# Patient Record
Sex: MALE | Race: WHITE
[De-identification: names, ages, dates, MRNs, and addresses within clinical notes are randomized per-mention and may not be internally consistent; named-entity substitution may affect disease eponyms.]

---

## 2019-01-14 PROBLEM — Z00.00 ENCOUNTER FOR PREVENTIVE HEALTH EXAMINATION: Status: ACTIVE | Noted: 2019-01-14

## 2019-01-17 ENCOUNTER — APPOINTMENT (OUTPATIENT)
Dept: SURGERY | Facility: CLINIC | Age: 49
End: 2019-01-17
Payer: COMMERCIAL

## 2019-01-17 VITALS
DIASTOLIC BLOOD PRESSURE: 84 MMHG | TEMPERATURE: 98 F | RESPIRATION RATE: 15 BRPM | WEIGHT: 236 LBS | HEART RATE: 85 BPM | BODY MASS INDEX: 37.04 KG/M2 | HEIGHT: 67 IN | OXYGEN SATURATION: 99 % | SYSTOLIC BLOOD PRESSURE: 117 MMHG

## 2019-01-17 DIAGNOSIS — Z82.49 FAMILY HISTORY OF ISCHEMIC HEART DISEASE AND OTHER DISEASES OF THE CIRCULATORY SYSTEM: ICD-10-CM

## 2019-01-17 DIAGNOSIS — Z78.9 OTHER SPECIFIED HEALTH STATUS: ICD-10-CM

## 2019-01-17 DIAGNOSIS — L72.3 SEBACEOUS CYST: ICD-10-CM

## 2019-01-17 DIAGNOSIS — Z86.14 PERSONAL HISTORY OF METHICILLIN RESISTANT STAPHYLOCOCCUS AUREUS INFECTION: ICD-10-CM

## 2019-01-17 PROCEDURE — 99243 OFF/OP CNSLTJ NEW/EST LOW 30: CPT

## 2019-01-17 NOTE — PHYSICAL EXAM
[Normal Breath Sounds] : Normal breath sounds [Normal Heart Sounds] : normal heart sounds [Normal Rate and Rhythm] : normal rate and rhythm [No Rash or Lesion] : No rash or lesion [Alert] : alert [Oriented to Person] : oriented to person [Oriented to Place] : oriented to place [Oriented to Time] : oriented to time [Calm] : calm [JVD] : no jugular venous distention  [de-identified] : Well nourished male, in no apparent distress [de-identified] : WNL [de-identified] : Full ROM [de-identified] : The small area of I&D of the cyst of the right axilla is nontender there is no further drainage there is still some induration in the area.

## 2019-01-17 NOTE — CONSULT LETTER
[Dear  ___] : Dear  [unfilled], [Consult Letter:] : I had the pleasure of evaluating your patient, [unfilled]. [Please see my note below.] : Please see my note below. [Consult Closing:] : Thank you very much for allowing me to participate in the care of this patient.  If you have any questions, please do not hesitate to contact me. [Sincerely,] : Sincerely, [FreeTextEntry3] : I have reviewed all the documentation for this encounter with the patient and have edited where appropriate\par \par Dr. Bo Corona

## 2019-01-17 NOTE — ASSESSMENT
[FreeTextEntry1] : There is still some surrounding induration from the previous cellulitis. I've asked the patient to return in approximately a week after he finishes his course of Bactrim. At that time if there is a residual cyst we will set the patient up to have an excision of that cyst of the right axilla.

## 2019-01-17 NOTE — HISTORY OF PRESENT ILLNESS
[de-identified] : Pito is a 49 y/o male here for evaluation of cyst. Patient is currently on Bactrim. PMH of MRSA. The patient noticed a swelling under his right arm and in the axilla which cause some pain on palpation. The patient squeezed this and set up an area of cellulitis of his arm which she required treatment at the hospital with a I&D the abscess. Patient has been on Bactrim since the seventh we'll complete his 2 week course on the 21st.  The patient is asymptomatic at the present time. There's been no further drainage from the area.

## 2019-01-29 ENCOUNTER — APPOINTMENT (OUTPATIENT)
Dept: SURGERY | Facility: CLINIC | Age: 49
End: 2019-01-29

## 2021-03-17 ENCOUNTER — APPOINTMENT (OUTPATIENT)
Dept: GASTROENTEROLOGY | Facility: CLINIC | Age: 51
End: 2021-03-17
Payer: COMMERCIAL

## 2021-03-17 VITALS
WEIGHT: 224 LBS | HEIGHT: 67 IN | BODY MASS INDEX: 35.16 KG/M2 | SYSTOLIC BLOOD PRESSURE: 120 MMHG | DIASTOLIC BLOOD PRESSURE: 79 MMHG

## 2021-03-17 DIAGNOSIS — Z80.7 FAMILY HISTORY OF OTHER MALIGNANT NEOPLASMS OF LYMPHOID, HEMATOPOIETIC AND RELATED TISSUES: ICD-10-CM

## 2021-03-17 PROCEDURE — 99213 OFFICE O/P EST LOW 20 MIN: CPT

## 2021-03-17 PROCEDURE — 99072 ADDL SUPL MATRL&STAF TM PHE: CPT

## 2021-03-17 RX ORDER — SULFAMETHOXAZOLE AND TRIMETHOPRIM 400; 80 MG/1; MG/1
TABLET ORAL
Refills: 0 | Status: DISCONTINUED | COMMUNITY
End: 2021-03-17

## 2021-03-17 NOTE — PHYSICAL EXAM
[General Appearance - Alert] : alert [General Appearance - In No Acute Distress] : in no acute distress [Sclera] : the sclera and conjunctiva were normal [PERRL With Normal Accommodation] : pupils were equal in size, round, and reactive to light [Extraocular Movements] : extraocular movements were intact [Outer Ear] : the ears and nose were normal in appearance [Oropharynx] : the oropharynx was normal [Neck Appearance] : the appearance of the neck was normal [Neck Cervical Mass (___cm)] : no neck mass was observed [Jugular Venous Distention Increased] : there was no jugular-venous distention [Thyroid Diffuse Enlargement] : the thyroid was not enlarged [Thyroid Nodule] : there were no palpable thyroid nodules [Auscultation Breath Sounds / Voice Sounds] : lungs were clear to auscultation bilaterally [Heart Rate And Rhythm] : heart rate was normal and rhythm regular [Heart Sounds] : normal S1 and S2 [Heart Sounds Gallop] : no gallops [Murmurs] : no murmurs [Heart Sounds Pericardial Friction Rub] : no pericardial rub [Bowel Sounds] : normal bowel sounds [Abdomen Soft] : soft [Abdomen Tenderness] : non-tender [Abdomen Mass (___ Cm)] : no abdominal mass palpated [Cervical Lymph Nodes Enlarged Posterior Bilaterally] : posterior cervical [Cervical Lymph Nodes Enlarged Anterior Bilaterally] : anterior cervical [Supraclavicular Lymph Nodes Enlarged Bilaterally] : supraclavicular [Axillary Lymph Nodes Enlarged Bilaterally] : axillary [Femoral Lymph Nodes Enlarged Bilaterally] : femoral [Inguinal Lymph Nodes Enlarged Bilaterally] : inguinal [No CVA Tenderness] : no ~M costovertebral angle tenderness [No Spinal Tenderness] : no spinal tenderness [Abnormal Walk] : normal gait [Nail Clubbing] : no clubbing  or cyanosis of the fingernails [Musculoskeletal - Swelling] : no joint swelling seen [Motor Tone] : muscle strength and tone were normal [Skin Color & Pigmentation] : normal skin color and pigmentation [Skin Turgor] : normal skin turgor [] : no rash [Deep Tendon Reflexes (DTR)] : deep tendon reflexes were 2+ and symmetric [Sensation] : the sensory exam was normal to light touch and pinprick [No Focal Deficits] : no focal deficits [Oriented To Time, Place, And Person] : oriented to person, place, and time [Impaired Insight] : insight and judgment were intact [Affect] : the affect was normal

## 2021-03-17 NOTE — CONSULT LETTER
[Dear  ___] : Dear  [unfilled], [Consult Letter:] : I had the pleasure of evaluating your patient, [unfilled]. [( Thank you for referring [unfilled] for consultation for _____ )] : Thank you for referring [unfilled] for consultation for [unfilled] [Please see my note below.] : Please see my note below. [Consult Closing:] : Thank you very much for allowing me to participate in the care of this patient.  If you have any questions, please do not hesitate to contact me. [Sincerely,] : Sincerely, [FreeTextEntry3] : Don\par \par Emiliano Fuentes MD\par

## 2021-03-17 NOTE — ASSESSMENT
[FreeTextEntry1] : Colon Cancer Screening\par \par AVRIL RAMIREZ was advised to undergo colonoscopy to which he agreed. The procedure will be performed in [facility] \[facility] with the assistance of an anesthesiologist. The patient was given a Suprep preparation prescription and understood the \par procedure as it was explained to his. He was given a booklet distributed by the American Society of Gastrointestinal\par  Endoscopy explaining the procedure in detail and he understood the risks of the procedure not limited to infection, bleeding,\par perforation or non- diagnosis of colorectal cancer. He was advised that he could not drive home, if he chooses to\par  receive sedation.\par \par Further diagnostic and treatment recommendations will be based upon the procedure and any biopsies, if they are taken.\par \par Thank you for allowing me to participate in this [facility] health care.\par \par , Best personal regards -- Don\par

## 2021-03-17 NOTE — HISTORY OF PRESENT ILLNESS
[FreeTextEntry1] : He is 51 year old asymptomatic male referred for a  screening colonoscopy.  He denies a family history of colon cancer.

## 2021-03-29 ENCOUNTER — LABORATORY RESULT (OUTPATIENT)
Age: 51
End: 2021-03-29

## 2021-04-01 ENCOUNTER — APPOINTMENT (OUTPATIENT)
Dept: GASTROENTEROLOGY | Facility: AMBULATORY SURGERY CENTER | Age: 51
End: 2021-04-01
Payer: COMMERCIAL

## 2021-04-01 DIAGNOSIS — K63.5 POLYP OF COLON: ICD-10-CM

## 2021-04-01 PROCEDURE — 45380 COLONOSCOPY AND BIOPSY: CPT | Mod: 33,59

## 2021-04-01 PROCEDURE — 45385 COLONOSCOPY W/LESION REMOVAL: CPT | Mod: 33

## 2021-04-05 PROBLEM — K63.5 COLON POLYPS: Status: ACTIVE | Noted: 2021-04-05

## 2022-12-30 ENCOUNTER — APPOINTMENT (OUTPATIENT)
Dept: NEUROSURGERY | Facility: CLINIC | Age: 52
End: 2022-12-30
Payer: COMMERCIAL

## 2022-12-30 ENCOUNTER — NON-APPOINTMENT (OUTPATIENT)
Age: 52
End: 2022-12-30

## 2022-12-30 DIAGNOSIS — Z87.891 PERSONAL HISTORY OF NICOTINE DEPENDENCE: ICD-10-CM

## 2022-12-30 DIAGNOSIS — G50.0 TRIGEMINAL NEURALGIA: ICD-10-CM

## 2022-12-30 PROCEDURE — 99204 OFFICE O/P NEW MOD 45 MIN: CPT

## 2022-12-30 RX ORDER — OXCARBAZEPINE 150 MG/1
150 TABLET, FILM COATED ORAL
Refills: 0 | Status: ACTIVE | COMMUNITY

## 2022-12-30 RX ORDER — SODIUM SULFATE, POTASSIUM SULFATE, MAGNESIUM SULFATE 17.5; 3.13; 1.6 G/ML; G/ML; G/ML
17.5-3.13-1.6 SOLUTION, CONCENTRATE ORAL TWICE DAILY
Qty: 2 | Refills: 0 | Status: DISCONTINUED | COMMUNITY
Start: 2021-03-17 | End: 2022-12-30

## 2023-01-05 PROBLEM — G50.0 RIGHT TRIGEMINAL NEURALGIA: Status: ACTIVE | Noted: 2023-01-05

## 2023-01-05 NOTE — ASSESSMENT
[FreeTextEntry1] : IMPRESSION:\par 1. Pt with right V3 pain that started about 1.5 - 2 weeks ago.  He started taking Oxcarbazepine 100 mg daily and pain subsided.\par \par \par \par PLAN:\par 1. Continue Oxcarbazepine as ordered and follow up with Dr. Paulson.\par 2. Explained that there is no indication for neurosurgical intervention at this time as pain is being managed with Tegretol unless patient would like to proceed.\par 3. F/U in 3 months clinical follow up - no imaging needed prior to visit.\par 4. Explained treatment options of radiosurgery, injection of numbing medications and MVD with risks and benefits of each option discussed\par

## 2023-01-05 NOTE — PHYSICAL EXAM
[Motor Handedness Right-Handed] : the patient is right hand dominant [General Appearance - Alert] : alert [General Appearance - Well Nourished] : well nourished [General Appearance - Well-Appearing] : healthy appearing [Oriented To Time, Place, And Person] : oriented to person, place, and time [Affect] : the affect was normal [Memory Recent] : recent memory was not impaired [Person] : oriented to person [Place] : oriented to place [Time] : oriented to time [Sensation Tactile Decrease] : light touch was intact [Sclera] : the sclera and conjunctiva were normal [Outer Ear] : the ears and nose were normal in appearance [Neck Appearance] : the appearance of the neck was normal [] : no respiratory distress [Respiration, Rhythm And Depth] : normal respiratory rhythm and effort [Exaggerated Use Of Accessory Muscles For Inspiration] : no accessory muscle use [Heart Rate And Rhythm] : heart rate was normal and rhythm regular [Abnormal Walk] : normal gait [Involuntary Movements] : no involuntary movements were seen [Motor Tone] : muscle strength and tone were normal [Skin Color & Pigmentation] : normal skin color and pigmentation

## 2023-01-05 NOTE — HISTORY OF PRESENT ILLNESS
[< 3 months] : less than 3 months [FreeTextEntry1] : "Pain in jaw rt side" [de-identified] : Pito Gonzalez is a pleasant 52 year old gentleman who presents for consultation regarding V3 pain described as a shooting pain 10/10 and lasted for about 3 days that occurred about 1.5 - 2 months ago.  Pt followed up with a dentist and was reassured that his teeth are in good repair.  He followed with Neurologist Dr. Paulson and takes Oxcarbazepine 100 mg daily for the past 1.5 weeks.  States that he does not take the medication on a daily basis however the pain decreased after he started taking the medication.  He reports a weird sensation that is not pain when he chews on the right side so he avoids doing that.\par \par Pain is described as intense when it started and is aggravated by cold air, eating.  \par \par

## 2023-02-27 ENCOUNTER — APPOINTMENT (OUTPATIENT)
Dept: ORTHOPEDIC SURGERY | Facility: CLINIC | Age: 53
End: 2023-02-27
Payer: COMMERCIAL

## 2023-02-27 VITALS — WEIGHT: 224 LBS | HEIGHT: 67 IN | BODY MASS INDEX: 35.16 KG/M2

## 2023-02-27 PROCEDURE — 73502 X-RAY EXAM HIP UNI 2-3 VIEWS: CPT

## 2023-02-27 PROCEDURE — 99204 OFFICE O/P NEW MOD 45 MIN: CPT

## 2023-02-27 PROCEDURE — 73610 X-RAY EXAM OF ANKLE: CPT | Mod: LT

## 2023-02-27 NOTE — PHYSICAL EXAM
[Lateral] : lateral [Mild arthritis (Tonnis Grade 1)] : Mild arthritis (Tonnis Grade 1) [5___] : plantar flexion 5[unfilled]/5 [2+] : posterior tibialis pulse: 2+ [Left] : left ankle [Right] : right hip [AP] : anteroposterior [There are no fractures, subluxations or dislocations. No significant abnormalities are seen] : There are no fractures, subluxations or dislocations. No significant abnormalities are seen [Weight -] : weightbearing [] : no calf tenderness [FreeTextEntry9] : calc spur [de-identified] : plantar flexion 30 degrees [TWNoteComboBox7] : dorsiflexion 10 degrees

## 2023-02-27 NOTE — HISTORY OF PRESENT ILLNESS
[5] : 5 [Dull/Aching] : dull/aching [Sharp] : sharp [Throbbing] : throbbing [Constant] : constant [de-identified] : 52 year old male with pain in the right hip and left ankle/Achilles region. Symptoms in the right hip feels like the muscles are all tight in the area, pain on the side of the hip, walking, sleeping, certain motions increase the symptoms. As or the Achilles, he has been told he has a bone spur that irritates the achilles and that recently flared up. Has been limping.Recurred a few moths ago. He went to PT a few times. From favoring developed pain in R hip and gluteal area [] : no [FreeTextEntry1] : left achilles, right hip  [FreeTextEntry6] : weakness

## 2023-03-31 ENCOUNTER — APPOINTMENT (OUTPATIENT)
Dept: NEUROSURGERY | Facility: CLINIC | Age: 53
End: 2023-03-31

## 2023-06-27 ENCOUNTER — APPOINTMENT (OUTPATIENT)
Dept: ORTHOPEDIC SURGERY | Facility: CLINIC | Age: 53
End: 2023-06-27
Payer: COMMERCIAL

## 2023-06-27 VITALS — BODY MASS INDEX: 35.16 KG/M2 | HEIGHT: 67 IN | WEIGHT: 224 LBS

## 2023-06-27 DIAGNOSIS — M70.71 OTHER BURSITIS OF HIP, RIGHT HIP: ICD-10-CM

## 2023-06-27 DIAGNOSIS — S76.011D STRAIN OF MUSCLE, FASCIA AND TENDON OF RIGHT HIP, SUBSEQUENT ENCOUNTER: ICD-10-CM

## 2023-06-27 DIAGNOSIS — M76.62 ACHILLES TENDINITIS, LEFT LEG: ICD-10-CM

## 2023-06-27 DIAGNOSIS — M76.822 POSTERIOR TIBIAL TENDINITIS, LEFT LEG: ICD-10-CM

## 2023-06-27 PROCEDURE — 99214 OFFICE O/P EST MOD 30 MIN: CPT

## 2023-06-27 RX ORDER — MELOXICAM 15 MG/1
15 TABLET ORAL
Qty: 30 | Refills: 1 | Status: ACTIVE | COMMUNITY
Start: 2023-06-27 | End: 1900-01-01

## 2023-06-27 NOTE — HISTORY OF PRESENT ILLNESS
[10] : 10 [8] : 8 [Burning] : burning [Dull/Aching] : dull/aching [Sharp] : sharp [Tightness] : tightness [Leisure] : leisure [Work] : work [Meds] : meds [Standing] : standing [Walking] : walking [Stairs] : stairs [de-identified] : pain in the right hip and left ankle/Achilles region. Symptoms in the right hip feels like the muscles are all tight in the area, pain on the side of the hip, walking, sleeping, certain motions increase the symptoms. As or the Achilles, he has been told he has a bone spur that irritates the achilles and that rothers him dustin when he walks, and had MRI ankle [] : no [FreeTextEntry1] :  Left Achilles/ Left Hip [FreeTextEntry7] : pain moves down to calf [de-identified] : [facility] [de-identified] : 02/27/23 [de-identified] : Dr. Brown [de-identified] : MRI [de-identified] : Patient was prescribed naproxen

## 2023-06-27 NOTE — PHYSICAL EXAM
[Left] : left foot and ankle [5___] : plantar flexion 5[unfilled]/5 [2+] : posterior tibialis pulse: 2+ [] : patient ambulates without assistive device [de-identified] : plantar flexion 30 degrees [TWNoteComboBox7] : dorsiflexion 10 degrees

## 2023-06-27 NOTE — DISCUSSION/SUMMARY
[de-identified] : \par \par RE:  AVRIL RAMIREZ \par \par Acct #- [id number] \par \par Attention:  Nurse Reviewer /Medical Director\par \par  \par Based on my patient's condition, I strongly believe that the MRI  R hipis medically.necessary.  \par The patient has failed oral meds, injections and PT and conservative treatment in combination or by themselves and therefore needs the MRI.  \par The MRI will dictate further treatment t recommendations.\par \par try lido\par mod activity\par poss csi hip discussed\par

## 2023-11-05 NOTE — DATA REVIEWED
[MRI] : MRI [Left] : left [Ankle] : ankle [Report was reviewed and noted in the chart] : The report was reviewed and noted in the chart [I reviewed the films/CD and agree] : I reviewed the films/CD and agree [FreeTextEntry1] : PT tendonitis, old atfl, achilles tendonitis, calc spur 01-Nov-2023 11:15

## 2024-01-18 ENCOUNTER — OFFICE (OUTPATIENT)
Dept: URBAN - METROPOLITAN AREA CLINIC 90 | Facility: CLINIC | Age: 54
Setting detail: OPHTHALMOLOGY
End: 2024-01-18
Payer: COMMERCIAL

## 2024-01-18 DIAGNOSIS — H01.004: ICD-10-CM

## 2024-01-18 DIAGNOSIS — H52.4: ICD-10-CM

## 2024-01-18 DIAGNOSIS — H25.13: ICD-10-CM

## 2024-01-18 DIAGNOSIS — H01.001: ICD-10-CM

## 2024-01-18 PROBLEM — H52.7 REFRACTIVE ERROR: Status: ACTIVE | Noted: 2024-01-18

## 2024-01-18 PROCEDURE — 92004 COMPRE OPH EXAM NEW PT 1/>: CPT | Performed by: OPHTHALMOLOGY

## 2024-01-18 PROCEDURE — 92015 DETERMINE REFRACTIVE STATE: CPT | Performed by: OPHTHALMOLOGY

## 2024-01-18 ASSESSMENT — SPHEQUIV_DERIVED
OD_SPHEQUIV: 0.75
OD_SPHEQUIV: 1
OS_SPHEQUIV: 0.5
OS_SPHEQUIV: 0.25

## 2024-01-18 ASSESSMENT — REFRACTION_AUTOREFRACTION
OD_AXIS: 104
OD_CYLINDER: -0.50
OD_SPHERE: +1.25
OS_CYLINDER: -1.00
OS_SPHERE: +1.00
OS_AXIS: 070

## 2024-01-18 ASSESSMENT — CONFRONTATIONAL VISUAL FIELD TEST (CVF)
OD_FINDINGS: FULL
OS_FINDINGS: FULL

## 2024-01-18 ASSESSMENT — REFRACTION_MANIFEST
OS_AXIS: 070
OD_VA1: 20/20
OD_SPHERE: +1.00
OS_SPHERE: +0.75
OD_AXIS: 105
OS_CYLINDER: -1.00
OS_ADD: +1.50
OS_VA1: 20/20
OD_CYLINDER: -0.50
OD_ADD: +1.50

## 2024-01-18 ASSESSMENT — REFRACTION_CURRENTRX
OD_CYLINDER: 0.00
OD_VPRISM_DIRECTION: SV
OD_OVR_VA: 20/
OD_AXIS: 180
OS_VPRISM_DIRECTION: SV
OS_OVR_VA: 20/
OS_SPHERE: +1.75
OS_CYLINDER: 0.00
OS_AXIS: 180
OD_SPHERE: +1.75

## 2024-01-18 ASSESSMENT — LID EXAM ASSESSMENTS
OD_BLEPHARITIS: RUL 2+
OS_BLEPHARITIS: LUL 2+

## 2024-06-18 ENCOUNTER — APPOINTMENT (OUTPATIENT)
Dept: GASTROENTEROLOGY | Facility: CLINIC | Age: 54
End: 2024-06-18
Payer: COMMERCIAL

## 2024-06-18 VITALS
HEART RATE: 86 BPM | DIASTOLIC BLOOD PRESSURE: 76 MMHG | RESPIRATION RATE: 16 BRPM | OXYGEN SATURATION: 95 % | BODY MASS INDEX: 35.16 KG/M2 | WEIGHT: 224 LBS | SYSTOLIC BLOOD PRESSURE: 124 MMHG | TEMPERATURE: 97.8 F | HEIGHT: 67 IN

## 2024-06-18 DIAGNOSIS — Z86.010 PERSONAL HISTORY OF COLONIC POLYPS: ICD-10-CM

## 2024-06-18 DIAGNOSIS — Z12.11 ENCOUNTER FOR SCREENING FOR MALIGNANT NEOPLASM OF COLON: ICD-10-CM

## 2024-06-18 PROCEDURE — 99204 OFFICE O/P NEW MOD 45 MIN: CPT

## 2024-06-19 PROBLEM — Z12.11 COLON CANCER SCREENING: Status: ACTIVE | Noted: 2021-03-17

## 2024-06-19 PROBLEM — Z86.010 HISTORY OF COLON POLYPS: Status: ACTIVE | Noted: 2024-06-19

## 2024-06-19 RX ORDER — SODIUM SULFATE, POTASSIUM SULFATE AND MAGNESIUM SULFATE 1.6; 3.13; 17.5 G/177ML; G/177ML; G/177ML
17.5-3.13-1.6 SOLUTION ORAL TWICE DAILY
Qty: 2 | Refills: 0 | Status: ACTIVE | COMMUNITY
Start: 2024-06-19 | End: 1900-01-01

## 2024-06-19 NOTE — ASSESSMENT
[FreeTextEntry1] : AVRIL RAMIREZ was advised to undergo colonoscopy to which he agreed. The procedure will be performed in [facility]  [facility] with the assistance of an anesthesiologist. The patient was given a Suprep preparation prescription and understood the  procedure as it was explained to his. He was given a booklet distributed by the American Society of Gastrointestinal  Endoscopy explaining the procedure in detail and he understood the risks of the procedure not limited to infection, bleeding, perforation or non- diagnosis of colorectal cancer. He was advised that he could not drive home, if he chooses to  receive sedation.  Further diagnostic and treatment recommendations will be based upon the procedure and any biopsies, if they are taken.  Thank you for allowing me to participate in this [facility] care.  , Best personal regards -- Don   I spent 45 minutes with the patient and his wife and answered all of their questions

## 2024-06-19 NOTE — HISTORY OF PRESENT ILLNESS
[FreeTextEntry1] : He is a 54-year-old asymptomatic male referred for a screening colonoscopy.  He has a past history of colon polyps.  His last colonoscopy was April 1, 2021 in which 2 polyps were removed,  with 1 being greater than 10 mm.  He denies a family history of colon cancer.  He is feeling well and has no complaints.    His wife was in the room

## 2024-06-19 NOTE — CONSULT LETTER
[Dear  ___] : Dear  [unfilled], [Consult Letter:] : I had the pleasure of evaluating your patient, [unfilled]. [( Thank you for referring [unfilled] for consultation for _____ )] : Thank you for referring [unfilled] for consultation for [unfilled] [Please see my note below.] : Please see my note below. [Consult Closing:] : Thank you very much for allowing me to participate in the care of this patient.  If you have any questions, please do not hesitate to contact me. [Sincerely,] : Sincerely, [FreeTextEntry3] : Myke Fuentes MD  Gastroenterology [facility], [place], operated by [facility]

## 2024-09-26 ENCOUNTER — APPOINTMENT (OUTPATIENT)
Dept: GASTROENTEROLOGY | Facility: AMBULATORY SURGERY CENTER | Age: 54
End: 2024-09-26
Payer: COMMERCIAL

## 2024-09-26 PROCEDURE — 45378 DIAGNOSTIC COLONOSCOPY: CPT
